# Patient Record
Sex: FEMALE | Race: OTHER | HISPANIC OR LATINO | URBAN - METROPOLITAN AREA
[De-identification: names, ages, dates, MRNs, and addresses within clinical notes are randomized per-mention and may not be internally consistent; named-entity substitution may affect disease eponyms.]

---

## 2017-11-04 ENCOUNTER — EMERGENCY (EMERGENCY)
Facility: HOSPITAL | Age: 42
LOS: 1 days | Discharge: HOME | End: 2017-11-04
Attending: EMERGENCY MEDICINE
Payer: MEDICAID

## 2017-11-04 VITALS
HEIGHT: 62 IN | TEMPERATURE: 97 F | HEART RATE: 87 BPM | RESPIRATION RATE: 20 BRPM | OXYGEN SATURATION: 100 % | WEIGHT: 115.08 LBS

## 2017-11-04 LAB
ACETONE SERPL-MCNC: NEGATIVE — SIGNIFICANT CHANGE UP
ALBUMIN SERPL ELPH-MCNC: 3.6 G/DL — SIGNIFICANT CHANGE UP (ref 3.5–5)
ALP SERPL-CCNC: 87 U/L — SIGNIFICANT CHANGE UP (ref 40–120)
ALT FLD-CCNC: 22 U/L DA — SIGNIFICANT CHANGE UP (ref 10–60)
ANION GAP SERPL CALC-SCNC: 8 MMOL/L — SIGNIFICANT CHANGE UP (ref 5–17)
APPEARANCE UR: CLEAR — SIGNIFICANT CHANGE UP
AST SERPL-CCNC: 13 U/L — SIGNIFICANT CHANGE UP (ref 10–40)
BACTERIA # UR AUTO: ABNORMAL /HPF
BASOPHILS # BLD AUTO: 0.1 K/UL — SIGNIFICANT CHANGE UP (ref 0–0.2)
BASOPHILS NFR BLD AUTO: 1 % — SIGNIFICANT CHANGE UP (ref 0–2)
BILIRUB SERPL-MCNC: 0.1 MG/DL — LOW (ref 0.2–1.2)
BILIRUB UR-MCNC: NEGATIVE — SIGNIFICANT CHANGE UP
BUN SERPL-MCNC: 18 MG/DL — SIGNIFICANT CHANGE UP (ref 7–18)
CALCIUM SERPL-MCNC: 8.4 MG/DL — SIGNIFICANT CHANGE UP (ref 8.4–10.5)
CHLORIDE SERPL-SCNC: 109 MMOL/L — HIGH (ref 96–108)
CO2 SERPL-SCNC: 23 MMOL/L — SIGNIFICANT CHANGE UP (ref 22–31)
COLOR SPEC: YELLOW — SIGNIFICANT CHANGE UP
CREAT SERPL-MCNC: 0.69 MG/DL — SIGNIFICANT CHANGE UP (ref 0.5–1.3)
DIFF PNL FLD: ABNORMAL
EOSINOPHIL # BLD AUTO: 0.2 K/UL — SIGNIFICANT CHANGE UP (ref 0–0.5)
EOSINOPHIL NFR BLD AUTO: 2.6 % — SIGNIFICANT CHANGE UP (ref 0–6)
EPI CELLS # UR: SIGNIFICANT CHANGE UP /HPF
GLUCOSE SERPL-MCNC: 93 MG/DL — SIGNIFICANT CHANGE UP (ref 70–99)
GLUCOSE UR QL: NEGATIVE — SIGNIFICANT CHANGE UP
HCG UR QL: NEGATIVE — SIGNIFICANT CHANGE UP
HCT VFR BLD CALC: 38.3 % — SIGNIFICANT CHANGE UP (ref 34.5–45)
HGB BLD-MCNC: 11.6 G/DL — SIGNIFICANT CHANGE UP (ref 11.5–15.5)
KETONES UR-MCNC: ABNORMAL
LEUKOCYTE ESTERASE UR-ACNC: ABNORMAL
LIDOCAIN IGE QN: 259 U/L — SIGNIFICANT CHANGE UP (ref 73–393)
LYMPHOCYTES # BLD AUTO: 1.2 K/UL — SIGNIFICANT CHANGE UP (ref 1–3.3)
LYMPHOCYTES # BLD AUTO: 17.6 % — SIGNIFICANT CHANGE UP (ref 13–44)
MAGNESIUM SERPL-MCNC: 2.1 MG/DL — SIGNIFICANT CHANGE UP (ref 1.6–2.6)
MCHC RBC-ENTMCNC: 24.9 PG — LOW (ref 27–34)
MCHC RBC-ENTMCNC: 30.4 GM/DL — LOW (ref 32–36)
MCV RBC AUTO: 82 FL — SIGNIFICANT CHANGE UP (ref 80–100)
MONOCYTES # BLD AUTO: 0.5 K/UL — SIGNIFICANT CHANGE UP (ref 0–0.9)
MONOCYTES NFR BLD AUTO: 7.8 % — SIGNIFICANT CHANGE UP (ref 2–14)
NEUTROPHILS # BLD AUTO: 4.9 K/UL — SIGNIFICANT CHANGE UP (ref 1.8–7.4)
NEUTROPHILS NFR BLD AUTO: 71 % — SIGNIFICANT CHANGE UP (ref 43–77)
NITRITE UR-MCNC: NEGATIVE — SIGNIFICANT CHANGE UP
PH UR: 5 — SIGNIFICANT CHANGE UP (ref 5–8)
PLATELET # BLD AUTO: 220 K/UL — SIGNIFICANT CHANGE UP (ref 150–400)
POTASSIUM SERPL-MCNC: 3.9 MMOL/L — SIGNIFICANT CHANGE UP (ref 3.5–5.3)
POTASSIUM SERPL-SCNC: 3.9 MMOL/L — SIGNIFICANT CHANGE UP (ref 3.5–5.3)
PROT SERPL-MCNC: 7.1 G/DL — SIGNIFICANT CHANGE UP (ref 6–8.3)
PROT UR-MCNC: NEGATIVE — SIGNIFICANT CHANGE UP
RBC # BLD: 4.67 M/UL — SIGNIFICANT CHANGE UP (ref 3.8–5.2)
RBC # FLD: 14.4 % — SIGNIFICANT CHANGE UP (ref 10.3–14.5)
RBC CASTS # UR COMP ASSIST: SIGNIFICANT CHANGE UP /HPF (ref 0–2)
SODIUM SERPL-SCNC: 140 MMOL/L — SIGNIFICANT CHANGE UP (ref 135–145)
SP GR SPEC: 1.02 — SIGNIFICANT CHANGE UP (ref 1.01–1.02)
UROBILINOGEN FLD QL: NEGATIVE — SIGNIFICANT CHANGE UP
WBC # BLD: 7 K/UL — SIGNIFICANT CHANGE UP (ref 3.8–10.5)
WBC # FLD AUTO: 7 K/UL — SIGNIFICANT CHANGE UP (ref 3.8–10.5)
WBC UR QL: SIGNIFICANT CHANGE UP /HPF (ref 0–5)

## 2017-11-04 PROCEDURE — 85027 COMPLETE CBC AUTOMATED: CPT

## 2017-11-04 PROCEDURE — 76830 TRANSVAGINAL US NON-OB: CPT | Mod: 26

## 2017-11-04 PROCEDURE — 76856 US EXAM PELVIC COMPLETE: CPT

## 2017-11-04 PROCEDURE — 87086 URINE CULTURE/COLONY COUNT: CPT

## 2017-11-04 PROCEDURE — 82009 KETONE BODYS QUAL: CPT

## 2017-11-04 PROCEDURE — 99285 EMERGENCY DEPT VISIT HI MDM: CPT

## 2017-11-04 PROCEDURE — 76830 TRANSVAGINAL US NON-OB: CPT

## 2017-11-04 PROCEDURE — 96375 TX/PRO/DX INJ NEW DRUG ADDON: CPT

## 2017-11-04 PROCEDURE — 81001 URINALYSIS AUTO W/SCOPE: CPT

## 2017-11-04 PROCEDURE — 76856 US EXAM PELVIC COMPLETE: CPT | Mod: 26

## 2017-11-04 PROCEDURE — 81025 URINE PREGNANCY TEST: CPT

## 2017-11-04 PROCEDURE — 99284 EMERGENCY DEPT VISIT MOD MDM: CPT | Mod: 25

## 2017-11-04 PROCEDURE — 83735 ASSAY OF MAGNESIUM: CPT

## 2017-11-04 PROCEDURE — 83690 ASSAY OF LIPASE: CPT

## 2017-11-04 PROCEDURE — 96374 THER/PROPH/DIAG INJ IV PUSH: CPT

## 2017-11-04 PROCEDURE — 80053 COMPREHEN METABOLIC PANEL: CPT

## 2017-11-04 RX ORDER — SODIUM CHLORIDE 9 MG/ML
1000 INJECTION INTRAMUSCULAR; INTRAVENOUS; SUBCUTANEOUS
Qty: 0 | Refills: 0 | Status: DISCONTINUED | OUTPATIENT
Start: 2017-11-04 | End: 2017-11-11

## 2017-11-04 RX ORDER — FAMOTIDINE 10 MG/ML
20 INJECTION INTRAVENOUS ONCE
Qty: 0 | Refills: 0 | Status: COMPLETED | OUTPATIENT
Start: 2017-11-04 | End: 2017-11-04

## 2017-11-04 RX ORDER — KETOROLAC TROMETHAMINE 30 MG/ML
30 SYRINGE (ML) INJECTION ONCE
Qty: 0 | Refills: 0 | Status: DISCONTINUED | OUTPATIENT
Start: 2017-11-04 | End: 2017-11-04

## 2017-11-04 RX ORDER — SODIUM CHLORIDE 9 MG/ML
3 INJECTION INTRAMUSCULAR; INTRAVENOUS; SUBCUTANEOUS ONCE
Qty: 0 | Refills: 0 | Status: COMPLETED | OUTPATIENT
Start: 2017-11-04 | End: 2017-11-04

## 2017-11-04 RX ADMIN — SODIUM CHLORIDE 125 MILLILITER(S): 9 INJECTION INTRAMUSCULAR; INTRAVENOUS; SUBCUTANEOUS at 10:49

## 2017-11-04 RX ADMIN — Medication 30 MILLIGRAM(S): at 11:31

## 2017-11-04 RX ADMIN — SODIUM CHLORIDE 3 MILLILITER(S): 9 INJECTION INTRAMUSCULAR; INTRAVENOUS; SUBCUTANEOUS at 10:49

## 2017-11-04 RX ADMIN — FAMOTIDINE 20 MILLIGRAM(S): 10 INJECTION INTRAVENOUS at 10:48

## 2017-11-04 RX ADMIN — Medication 30 MILLIGRAM(S): at 11:01

## 2017-11-04 NOTE — ED PROVIDER NOTE - MEDICAL DECISION MAKING DETAILS
43 y/o F pt w/ pelvic pain, concern for torsion versus ruptured ovarian cyst. Unlikely PID. Will get labs, sono, analgesic and reassess

## 2017-11-04 NOTE — ED ADULT NURSE NOTE - OBJECTIVE STATEMENT
Pt states has lower abd pain since 0930, denies nausea, vomiting or diarrhea  Had similar pain a few weeks ago, her Ob -Gyn told her she has Left ovarian cyst, but it is too small.

## 2017-11-04 NOTE — ED PROVIDER NOTE - OBJECTIVE STATEMENT
41 y/o F pt w/ PMHx of HIV (last viral load was undetected 3 months ago, CD4 count approximately 500) presents to ED c/o lower pelvic pain x1 hour. Pt notes that her pain is radiating down her R leg and constant, 9/10 in severity and associated w/ dysuria. Pt denies fever, N/V/D, hematuria, frequency, vaginal discharge, or any other complaints. Pt reports no history of STDs. Pt had similar pain 2 months ago; pt had sono done which showed L ovarian cyst, no intervention needed at that time. LMP 10/10/17 per pt. NKDA.

## 2017-11-05 LAB
CULTURE RESULTS: SIGNIFICANT CHANGE UP
SPECIMEN SOURCE: SIGNIFICANT CHANGE UP

## 2017-11-06 LAB
C TRACH RRNA SPEC QL NAA+PROBE: SIGNIFICANT CHANGE UP
N GONORRHOEA RRNA SPEC QL NAA+PROBE: SIGNIFICANT CHANGE UP
SPECIMEN SOURCE: SIGNIFICANT CHANGE UP

## 2018-12-25 ENCOUNTER — EMERGENCY (EMERGENCY)
Facility: HOSPITAL | Age: 43
LOS: 1 days | Discharge: HOME | End: 2018-12-25
Attending: EMERGENCY MEDICINE
Payer: MEDICAID

## 2018-12-25 VITALS — WEIGHT: 113.1 LBS | HEIGHT: 62 IN

## 2018-12-25 VITALS
RESPIRATION RATE: 17 BRPM | OXYGEN SATURATION: 100 % | TEMPERATURE: 98 F | DIASTOLIC BLOOD PRESSURE: 71 MMHG | HEART RATE: 78 BPM | SYSTOLIC BLOOD PRESSURE: 124 MMHG

## 2018-12-25 PROCEDURE — 99283 EMERGENCY DEPT VISIT LOW MDM: CPT | Mod: 25

## 2018-12-25 PROCEDURE — 90715 TDAP VACCINE 7 YRS/> IM: CPT

## 2018-12-25 PROCEDURE — 73630 X-RAY EXAM OF FOOT: CPT

## 2018-12-25 PROCEDURE — 90471 IMMUNIZATION ADMIN: CPT

## 2018-12-25 PROCEDURE — 73630 X-RAY EXAM OF FOOT: CPT | Mod: 26,LT

## 2018-12-25 PROCEDURE — 12001 RPR S/N/AX/GEN/TRNK 2.5CM/<: CPT

## 2018-12-25 RX ORDER — TETANUS TOXOID, REDUCED DIPHTHERIA TOXOID AND ACELLULAR PERTUSSIS VACCINE, ADSORBED 5; 2.5; 8; 8; 2.5 [IU]/.5ML; [IU]/.5ML; UG/.5ML; UG/.5ML; UG/.5ML
0.5 SUSPENSION INTRAMUSCULAR ONCE
Qty: 0 | Refills: 0 | Status: COMPLETED | OUTPATIENT
Start: 2018-12-25 | End: 2018-12-25

## 2018-12-25 RX ORDER — LIDOCAINE HCL 20 MG/ML
10 VIAL (ML) INJECTION ONCE
Qty: 0 | Refills: 0 | Status: DISCONTINUED | OUTPATIENT
Start: 2018-12-25 | End: 2018-12-29

## 2018-12-25 RX ADMIN — TETANUS TOXOID, REDUCED DIPHTHERIA TOXOID AND ACELLULAR PERTUSSIS VACCINE, ADSORBED 0.5 MILLILITER(S): 5; 2.5; 8; 8; 2.5 SUSPENSION INTRAMUSCULAR at 20:18

## 2018-12-25 NOTE — ED PROVIDER NOTE - OBJECTIVE STATEMENT
44 y/o F with PMHx of HIV (viral load undetectable) on triple therapy presents to the ED with complaints of laceration to L foot. Patient states she dropped a mug on her foot at approximately 09:30 this morning. Patient states she put Neosporin and wrapped her foot, however as the wound continued to bleed presented to the ED. Patient reports mild pain to the top of the foot. Tetanus vaccination is not up to date. NKDA. 44 y/o F with PMHx of HIV (viral load undetectable) on triple therapy presents to the ED with complaints of laceration to L foot. Patient states she dropped a mug on her foot at approximately 09:30 this morning. Patient states she put Neosporin and wrapped her foot, however as the wound continued to bleed presented to the ED. Patient reports mild pain to the top of the foot. Denies sensory or motor loss or any other symptoms. Tetanus vaccination is not up to date. NKDA.

## 2018-12-25 NOTE — ED PROVIDER NOTE - MEDICAL DECISION MAKING DETAILS
Simple laceration with no signs of fracture, foreign body or nerve impingement. Will obtain X-ray and suture. Counseled in return precautions, further symptomatic care and hygiene. Patient given Tetanus vaccination.

## 2018-12-25 NOTE — ED PROCEDURE NOTE - CPROC ED LACER REPAIR DETAIL1
Multiple flaps were aligned./The wound was explored to base in bloodless field./No foreign body The wound was explored to base in bloodless field./No foreign body

## 2018-12-25 NOTE — ED PROVIDER NOTE - PHYSICAL EXAMINATION
Afebrile, hemodynamically stable, saturating well  NAD, well appearing  Head NCAT  EOMI grossly, anicteric  MMM  No JVD  RRR, nml S1/S2, no m/r/g  Lungs CTAB, no w/r/r  Abd soft, NT, ND, nml BS, no rebound or guarding  AAO, CN's 3-12 grossly intact  RAYO spontaneously, no leg cyanosis or edema  Skin warm, well perfused, no rashes or hives Afebrile, hemodynamically stable, saturating well  NAD, well appearing  Head NCAT  EOMI grossly, anicteric  MMM  No JVD  RRR, nml S1/S2, no m/r/g  Lungs CTAB, no w/r/r  Abd soft, NT, ND, nml BS, no rebound or guarding  AAO, CN's 3-12 grossly intact  RAYO spontaneously, no leg cyanosis or edema  Skin warm, well perfused, no rashes or hives  1 cm laceration to dorsal L foot. Bleeding very well controlled without pressure. 2+ distal phalange pulses. Normal plantar and dorsiflexion. Normal warmth, color and sensation. No palpable foreign body. Normal gait. Afebrile, hemodynamically stable, saturating well  NAD, well appearing  Head NCAT  EOMI grossly  MMM  Breathing comfortably on RA  AAO, CN's 3-12 grossly intact  RAYO spontaneously, no leg cyanosis or edema  Skin warm, well perfused, no rashes or hives  1 cm laceration to dorsal L foot. Bleeding very well controlled without pressure. 2+ DP pulses. Normal plantar and dorsiflexion. Normal warmth, color and sensation. No palpable foreign body. Normal gait.

## 2018-12-25 NOTE — ED ADULT NURSE NOTE - NSIMPLEMENTINTERV_GEN_ALL_ED
Implemented All Universal Safety Interventions:  [place] to call system. Call bell, personal items and telephone within reach. Instruct patient to call for assistance. Room bathroom lighting operational. Non-slip footwear when patient is off stretcher. Physically safe environment: no spills, clutter or unnecessary equipment. Stretcher in lowest position, wheels locked, appropriate side rails in place.

## 2020-02-06 ENCOUNTER — EMERGENCY (EMERGENCY)
Facility: HOSPITAL | Age: 45
LOS: 1 days | Discharge: HOME | End: 2020-02-06
Attending: EMERGENCY MEDICINE
Payer: MEDICAID

## 2020-02-06 VITALS
RESPIRATION RATE: 20 BRPM | HEART RATE: 89 BPM | SYSTOLIC BLOOD PRESSURE: 131 MMHG | TEMPERATURE: 98 F | HEIGHT: 62 IN | WEIGHT: 117.95 LBS | OXYGEN SATURATION: 100 % | DIASTOLIC BLOOD PRESSURE: 90 MMHG

## 2020-02-06 LAB
ALBUMIN SERPL ELPH-MCNC: 3.8 G/DL — SIGNIFICANT CHANGE UP (ref 3.5–5)
ALP SERPL-CCNC: 85 U/L — SIGNIFICANT CHANGE UP (ref 40–120)
ALT FLD-CCNC: 39 U/L DA — SIGNIFICANT CHANGE UP (ref 10–60)
ANION GAP SERPL CALC-SCNC: 6 MMOL/L — SIGNIFICANT CHANGE UP (ref 5–17)
APPEARANCE UR: CLEAR — SIGNIFICANT CHANGE UP
AST SERPL-CCNC: 32 U/L — SIGNIFICANT CHANGE UP (ref 10–40)
BILIRUB SERPL-MCNC: 0.2 MG/DL — SIGNIFICANT CHANGE UP (ref 0.2–1.2)
BILIRUB UR-MCNC: NEGATIVE — SIGNIFICANT CHANGE UP
BUN SERPL-MCNC: 15 MG/DL — SIGNIFICANT CHANGE UP (ref 7–18)
CALCIUM SERPL-MCNC: 8.3 MG/DL — LOW (ref 8.4–10.5)
CHLORIDE SERPL-SCNC: 108 MMOL/L — SIGNIFICANT CHANGE UP (ref 96–108)
CO2 SERPL-SCNC: 24 MMOL/L — SIGNIFICANT CHANGE UP (ref 22–31)
COLOR SPEC: YELLOW — SIGNIFICANT CHANGE UP
CREAT SERPL-MCNC: 0.8 MG/DL — SIGNIFICANT CHANGE UP (ref 0.5–1.3)
DIFF PNL FLD: NEGATIVE — SIGNIFICANT CHANGE UP
GLUCOSE SERPL-MCNC: 110 MG/DL — HIGH (ref 70–99)
GLUCOSE UR QL: NEGATIVE — SIGNIFICANT CHANGE UP
HCG SERPL-ACNC: <1 MIU/ML — SIGNIFICANT CHANGE UP
HCT VFR BLD CALC: 39.1 % — SIGNIFICANT CHANGE UP (ref 34.5–45)
HGB BLD-MCNC: 12.4 G/DL — SIGNIFICANT CHANGE UP (ref 11.5–15.5)
KETONES UR-MCNC: NEGATIVE — SIGNIFICANT CHANGE UP
LEUKOCYTE ESTERASE UR-ACNC: NEGATIVE — SIGNIFICANT CHANGE UP
LIDOCAIN IGE QN: 139 U/L — SIGNIFICANT CHANGE UP (ref 73–393)
MCHC RBC-ENTMCNC: 24.2 PG — LOW (ref 27–34)
MCHC RBC-ENTMCNC: 31.7 GM/DL — LOW (ref 32–36)
MCV RBC AUTO: 76.4 FL — LOW (ref 80–100)
NITRITE UR-MCNC: NEGATIVE — SIGNIFICANT CHANGE UP
NRBC # BLD: 0 /100 WBCS — SIGNIFICANT CHANGE UP (ref 0–0)
PH UR: 6.5 — SIGNIFICANT CHANGE UP (ref 5–8)
PLATELET # BLD AUTO: 247 K/UL — SIGNIFICANT CHANGE UP (ref 150–400)
POTASSIUM SERPL-MCNC: 4.6 MMOL/L — SIGNIFICANT CHANGE UP (ref 3.5–5.3)
POTASSIUM SERPL-SCNC: 4.6 MMOL/L — SIGNIFICANT CHANGE UP (ref 3.5–5.3)
PROT SERPL-MCNC: 7.8 G/DL — SIGNIFICANT CHANGE UP (ref 6–8.3)
PROT UR-MCNC: NEGATIVE — SIGNIFICANT CHANGE UP
RBC # BLD: 5.12 M/UL — SIGNIFICANT CHANGE UP (ref 3.8–5.2)
RBC # FLD: 15.9 % — HIGH (ref 10.3–14.5)
SODIUM SERPL-SCNC: 138 MMOL/L — SIGNIFICANT CHANGE UP (ref 135–145)
SP GR SPEC: 1.01 — SIGNIFICANT CHANGE UP (ref 1.01–1.02)
UROBILINOGEN FLD QL: NEGATIVE — SIGNIFICANT CHANGE UP
WBC # BLD: 8.7 K/UL — SIGNIFICANT CHANGE UP (ref 3.8–10.5)
WBC # FLD AUTO: 8.7 K/UL — SIGNIFICANT CHANGE UP (ref 3.8–10.5)

## 2020-02-06 PROCEDURE — 87086 URINE CULTURE/COLONY COUNT: CPT

## 2020-02-06 PROCEDURE — 80053 COMPREHEN METABOLIC PANEL: CPT

## 2020-02-06 PROCEDURE — 96374 THER/PROPH/DIAG INJ IV PUSH: CPT

## 2020-02-06 PROCEDURE — 84702 CHORIONIC GONADOTROPIN TEST: CPT

## 2020-02-06 PROCEDURE — 99284 EMERGENCY DEPT VISIT MOD MDM: CPT

## 2020-02-06 PROCEDURE — 85027 COMPLETE CBC AUTOMATED: CPT

## 2020-02-06 PROCEDURE — 36415 COLL VENOUS BLD VENIPUNCTURE: CPT

## 2020-02-06 PROCEDURE — 83690 ASSAY OF LIPASE: CPT

## 2020-02-06 PROCEDURE — 99284 EMERGENCY DEPT VISIT MOD MDM: CPT | Mod: 25

## 2020-02-06 PROCEDURE — 81001 URINALYSIS AUTO W/SCOPE: CPT

## 2020-02-06 RX ORDER — SODIUM CHLORIDE 9 MG/ML
1000 INJECTION INTRAMUSCULAR; INTRAVENOUS; SUBCUTANEOUS ONCE
Refills: 0 | Status: COMPLETED | OUTPATIENT
Start: 2020-02-06 | End: 2020-02-06

## 2020-02-06 RX ORDER — KETOROLAC TROMETHAMINE 30 MG/ML
15 SYRINGE (ML) INJECTION ONCE
Refills: 0 | Status: DISCONTINUED | OUTPATIENT
Start: 2020-02-06 | End: 2020-02-06

## 2020-02-06 RX ADMIN — SODIUM CHLORIDE 1000 MILLILITER(S): 9 INJECTION INTRAMUSCULAR; INTRAVENOUS; SUBCUTANEOUS at 23:14

## 2020-02-06 RX ADMIN — Medication 15 MILLIGRAM(S): at 23:47

## 2020-02-07 VITALS
SYSTOLIC BLOOD PRESSURE: 125 MMHG | HEART RATE: 87 BPM | TEMPERATURE: 98 F | OXYGEN SATURATION: 98 % | DIASTOLIC BLOOD PRESSURE: 65 MMHG | RESPIRATION RATE: 19 BRPM

## 2020-02-07 RX ORDER — IBUPROFEN 200 MG
1 TABLET ORAL
Qty: 12 | Refills: 0
Start: 2020-02-07 | End: 2020-02-09

## 2020-02-07 RX ADMIN — Medication 15 MILLIGRAM(S): at 00:52

## 2020-02-07 NOTE — ED PROVIDER NOTE - PATIENT PORTAL LINK FT
You can access the FollowMyHealth Patient Portal offered by [facility] by registering at the following website: [email or web address]. By joining [employer]’s FollowMyHealth portal, you will also be able to view your health information using other applications (apps) compatible with our system.

## 2020-02-07 NOTE — ED PROVIDER NOTE - OBJECTIVE STATEMENT
43 yo F pmh of HIV, uterine fibroids, and ovarian cysts presents with lower abd pain x a 2-3 hours, initially L sided but now bilateral. Feels like previous episodes of ovarian cysts. Denies fever, n/v, flank pain, diarrhea, constipation, urinary complaints, other acute complaints. 45 yo F pmh of HIV, uterine fibroids, and ovarian cysts presents with lower abd pain x a 2-3 hours, initially L sided but now bilateral. Feels like previous episodes of ovarian cysts. Denies fever, n/v, flank pain, diarrhea, constipation, urinary complaints, other acute complaints. Pt states that she came to ED b/c she is concerned about cancer as she was told HIV can be associated with cancer.

## 2020-02-07 NOTE — ED PROVIDER NOTE - NSFOLLOWUPINSTRUCTIONS_ED_ALL_ED_FT
Pelvic Pain, Female  Pelvic pain is pain in your lower belly (abdomen), below your belly button and between your hips. The pain may start suddenly (be acute), keep coming back (be recurring), or last a long time (become chronic). Pelvic pain that lasts longer than 6 months is called chronic pelvic pain. There are many causes of pelvic pain. Sometimes the cause of pelvic pain is not known.  Follow these instructions at home:     Take over-the-counter and prescription medicines only as told by your doctor.Rest as told by your doctor.Do not have sex if it hurts.Keep a journal of your pelvic pain. Write down:  When the pain started.Where the pain is located.What seems to make the pain better or worse, such as food or your period (menstrual cycle).Any symptoms you have along with the pain.Keep all follow-up visits as told by your doctor. This is important.Contact a doctor if:  Medicine does not help your pain.Your pain comes back.You have new symptoms.You have unusual discharge or bleeding from your vagina.You have a fever or chills.You are having trouble pooping (constipation).You have blood in your pee (urine) or poop (stool).Your pee smells bad.You feel weak or light-headed.Get help right away if:  You have sudden pain that is very bad.Your pain keeps getting worse.You have very bad pain and also have any of these symptoms:  A fever.Feeling sick to your stomach (nausea).Throwing up (vomiting).Being very sweaty.You pass out (lose consciousness).Summary  Pelvic pain is pain in your lower belly (abdomen), below your belly button and between your hips.There are many possible causes of pelvic pain.Keep a journal of your pelvic pain.This information is not intended to replace advice given to you by your health care provider. Make sure you discuss any questions you have with your health care provider.    Document Released: 06/05/2009 Document Revised: 06/05/2019 Document Reviewed: 06/05/2019  [employer] Patient Education © 2019 [employer]

## 2020-02-07 NOTE — ED PROVIDER NOTE - PROGRESS NOTE DETAILS
labs - hcg negative  UA - negative  Feeling better after toradol. Symptoms possibly related to ovarian cyst. Will fu with ob gyn. Discussed indications for patient return to ED. Patient understood.

## 2020-02-07 NOTE — ED PROVIDER NOTE - CLINICAL SUMMARY MEDICAL DECISION MAKING FREE TEXT BOX
45 yo F with abd pain. Pt appears very comfortable, no suspicion for ovarian torsoin. Plan - UA, labs, meds, +/- imaging, reassess.

## 2020-02-08 LAB
CULTURE RESULTS: NO GROWTH — SIGNIFICANT CHANGE UP
SPECIMEN SOURCE: SIGNIFICANT CHANGE UP

## 2024-05-09 ENCOUNTER — EMERGENCY (EMERGENCY)
Facility: HOSPITAL | Age: 49
LOS: 1 days | Discharge: HOME | End: 2024-05-09
Attending: STUDENT IN AN ORGANIZED HEALTH CARE EDUCATION/TRAINING PROGRAM
Payer: SELF-PAY

## 2024-05-09 VITALS
RESPIRATION RATE: 18 BRPM | OXYGEN SATURATION: 100 % | TEMPERATURE: 98 F | HEART RATE: 82 BPM | DIASTOLIC BLOOD PRESSURE: 75 MMHG | WEIGHT: 142.42 LBS | SYSTOLIC BLOOD PRESSURE: 114 MMHG

## 2024-05-09 PROCEDURE — 99284 EMERGENCY DEPT VISIT MOD MDM: CPT

## 2024-05-09 RX ORDER — SODIUM CHLORIDE 9 MG/ML
1000 INJECTION INTRAMUSCULAR; INTRAVENOUS; SUBCUTANEOUS ONCE
Refills: 0 | Status: COMPLETED | OUTPATIENT
Start: 2024-05-09 | End: 2024-05-09

## 2024-05-09 RX ORDER — ACETAMINOPHEN 500 MG
975 TABLET ORAL ONCE
Refills: 0 | Status: COMPLETED | OUTPATIENT
Start: 2024-05-09 | End: 2024-05-09

## 2024-05-09 RX ORDER — METOCLOPRAMIDE HCL 10 MG
10 TABLET ORAL ONCE
Refills: 0 | Status: COMPLETED | OUTPATIENT
Start: 2024-05-09 | End: 2024-05-09

## 2024-05-09 NOTE — ED ADULT TRIAGE NOTE - CHIEF COMPLAINT QUOTE
I have headache, dizziness, left eye pain and blurred vision x 3 days at risk behaviors demonstrated

## 2024-05-10 VITALS
RESPIRATION RATE: 18 BRPM | TEMPERATURE: 98 F | SYSTOLIC BLOOD PRESSURE: 130 MMHG | OXYGEN SATURATION: 99 % | HEART RATE: 74 BPM | DIASTOLIC BLOOD PRESSURE: 86 MMHG

## 2024-05-10 LAB
ALBUMIN SERPL ELPH-MCNC: 3.5 G/DL — SIGNIFICANT CHANGE UP (ref 3.5–5)
ALP SERPL-CCNC: 78 U/L — SIGNIFICANT CHANGE UP (ref 40–120)
ALT FLD-CCNC: 24 U/L DA — SIGNIFICANT CHANGE UP (ref 10–60)
ANION GAP SERPL CALC-SCNC: 5 MMOL/L — SIGNIFICANT CHANGE UP (ref 5–17)
AST SERPL-CCNC: 14 U/L — SIGNIFICANT CHANGE UP (ref 10–40)
BASOPHILS # BLD AUTO: 0.03 K/UL — SIGNIFICANT CHANGE UP (ref 0–0.2)
BASOPHILS NFR BLD AUTO: 0.4 % — SIGNIFICANT CHANGE UP (ref 0–2)
BILIRUB SERPL-MCNC: 0.2 MG/DL — SIGNIFICANT CHANGE UP (ref 0.2–1.2)
BUN SERPL-MCNC: 16 MG/DL — SIGNIFICANT CHANGE UP (ref 7–18)
CALCIUM SERPL-MCNC: 8.7 MG/DL — SIGNIFICANT CHANGE UP (ref 8.4–10.5)
CHLORIDE SERPL-SCNC: 108 MMOL/L — SIGNIFICANT CHANGE UP (ref 96–108)
CO2 SERPL-SCNC: 26 MMOL/L — SIGNIFICANT CHANGE UP (ref 22–31)
CREAT SERPL-MCNC: 0.65 MG/DL — SIGNIFICANT CHANGE UP (ref 0.5–1.3)
EGFR: 107 ML/MIN/1.73M2 — SIGNIFICANT CHANGE UP
EOSINOPHIL # BLD AUTO: 0.47 K/UL — SIGNIFICANT CHANGE UP (ref 0–0.5)
EOSINOPHIL NFR BLD AUTO: 5.6 % — SIGNIFICANT CHANGE UP (ref 0–6)
GLUCOSE SERPL-MCNC: 98 MG/DL — SIGNIFICANT CHANGE UP (ref 70–99)
HCG SERPL-ACNC: <1 MIU/ML — SIGNIFICANT CHANGE UP
HCT VFR BLD CALC: 35.6 % — SIGNIFICANT CHANGE UP (ref 34.5–45)
HGB BLD-MCNC: 11.3 G/DL — LOW (ref 11.5–15.5)
IMM GRANULOCYTES NFR BLD AUTO: 1.1 % — HIGH (ref 0–0.9)
LYMPHOCYTES # BLD AUTO: 1.88 K/UL — SIGNIFICANT CHANGE UP (ref 1–3.3)
LYMPHOCYTES # BLD AUTO: 22.5 % — SIGNIFICANT CHANGE UP (ref 13–44)
MAGNESIUM SERPL-MCNC: 2.3 MG/DL — SIGNIFICANT CHANGE UP (ref 1.6–2.6)
MCHC RBC-ENTMCNC: 24.4 PG — LOW (ref 27–34)
MCHC RBC-ENTMCNC: 31.7 GM/DL — LOW (ref 32–36)
MCV RBC AUTO: 76.9 FL — LOW (ref 80–100)
MONOCYTES # BLD AUTO: 0.69 K/UL — SIGNIFICANT CHANGE UP (ref 0–0.9)
MONOCYTES NFR BLD AUTO: 8.3 % — SIGNIFICANT CHANGE UP (ref 2–14)
NEUTROPHILS # BLD AUTO: 5.2 K/UL — SIGNIFICANT CHANGE UP (ref 1.8–7.4)
NEUTROPHILS NFR BLD AUTO: 62.1 % — SIGNIFICANT CHANGE UP (ref 43–77)
NRBC # BLD: 0 /100 WBCS — SIGNIFICANT CHANGE UP (ref 0–0)
PLATELET # BLD AUTO: 252 K/UL — SIGNIFICANT CHANGE UP (ref 150–400)
POTASSIUM SERPL-MCNC: 4.2 MMOL/L — SIGNIFICANT CHANGE UP (ref 3.5–5.3)
POTASSIUM SERPL-SCNC: 4.2 MMOL/L — SIGNIFICANT CHANGE UP (ref 3.5–5.3)
PROT SERPL-MCNC: 7.2 G/DL — SIGNIFICANT CHANGE UP (ref 6–8.3)
RBC # BLD: 4.63 M/UL — SIGNIFICANT CHANGE UP (ref 3.8–5.2)
RBC # FLD: 15.6 % — HIGH (ref 10.3–14.5)
SODIUM SERPL-SCNC: 139 MMOL/L — SIGNIFICANT CHANGE UP (ref 135–145)
TROPONIN I, HIGH SENSITIVITY RESULT: <3 NG/L — SIGNIFICANT CHANGE UP
WBC # BLD: 8.36 K/UL — SIGNIFICANT CHANGE UP (ref 3.8–10.5)
WBC # FLD AUTO: 8.36 K/UL — SIGNIFICANT CHANGE UP (ref 3.8–10.5)

## 2024-05-10 PROCEDURE — 96374 THER/PROPH/DIAG INJ IV PUSH: CPT

## 2024-05-10 PROCEDURE — 36415 COLL VENOUS BLD VENIPUNCTURE: CPT

## 2024-05-10 PROCEDURE — 85025 COMPLETE CBC W/AUTO DIFF WBC: CPT

## 2024-05-10 PROCEDURE — 84484 ASSAY OF TROPONIN QUANT: CPT

## 2024-05-10 PROCEDURE — 84702 CHORIONIC GONADOTROPIN TEST: CPT

## 2024-05-10 PROCEDURE — 99284 EMERGENCY DEPT VISIT MOD MDM: CPT | Mod: 25

## 2024-05-10 PROCEDURE — 80053 COMPREHEN METABOLIC PANEL: CPT

## 2024-05-10 PROCEDURE — 83735 ASSAY OF MAGNESIUM: CPT

## 2024-05-10 RX ADMIN — Medication 10 MILLIGRAM(S): at 01:34

## 2024-05-10 RX ADMIN — Medication 975 MILLIGRAM(S): at 01:34

## 2024-05-10 RX ADMIN — SODIUM CHLORIDE 2000 MILLILITER(S): 9 INJECTION INTRAMUSCULAR; INTRAVENOUS; SUBCUTANEOUS at 01:34

## 2024-05-10 NOTE — ED PROVIDER NOTE - CLINICAL SUMMARY MEDICAL DECISION MAKING FREE TEXT BOX
49-year-old female, hx of HIV on HAART, presenting with L eye pain for 2 days and headache. No uptake on fluoroscein exam, visual acuity 20/25 bilaterally. Labs normally. Mitch primary headache however will send rx for polytrim drops given mild eye redness. Advised followup with Ophthalmologist.

## 2024-05-10 NOTE — ED PROVIDER NOTE - PATIENT PORTAL LINK FT
You can access the FollowMyHealth Patient Portal offered by [facility] by registering at the following website: [email or web address]. By joining [employer]’s FollowMyHealth portal, you will also be able to view your health information using other applications (apps) compatible with our system.

## 2024-05-10 NOTE — ED PROVIDER NOTE - NSFOLLOWUPINSTRUCTIONS_ED_ALL_ED_FT
You were seen in the emergency department for: eye pain/headache  Your results report is attached.   You were prescribed antibiotic eye drops.   We recommend you follow up with: your primary care doctor, Ophthalmology    Please return to the Emergency Department if you experience any of the following symptoms:   - Shortness of breath or trouble breathing  - Pressure, pain or tightness in the chest  - Face drooping, arm weakness or speech difficulty  - Persistence of severe vomiting  - Head injury or loss of consciousness  - Nonstop bleeding or an open wound    (1) Follow up with your primary care physician within the next 24-48 hours as discussed. In addition, we did not find evidence of a life threatening illness on your testing here today, but listed below are the specialists that will be necessary to see as an outpatient to continue the workup.  Please call the numbers listed below or [phone] to set up the necessary appointments.  (2) Take Tylenol (up to 1000mg or 1 g)  and/or Motrin (up to 600mg) up to every 6 hours as needed for pain.   (3) If you had an IV (intravenous) line placed, it was removed. Sometimes, after IV removal, that area can be tender for a few days; if it develops redness and swelling, those could be signs of infection; in which case, return to the Emergency Department for assessment.  (4) Please continue taking all of your home medications as directed.

## 2024-05-10 NOTE — ED PROVIDER NOTE - NSFOLLOWUPCLINICS_GEN_ALL_ED_FT
[facility]  Ophthalmology  [address] [place] [address], [facility] [address]  [place] [address]  Phone: [phone]  Fax:

## 2024-05-10 NOTE — ED PROVIDER NOTE - OBJECTIVE STATEMENT
49-year-old female, hx of HIV on HAART, presenting with L eye pain for 2 days. Vision blurry. Headache. Runny nose and throat itching. No other symptoms.

## 2024-05-10 NOTE — ED ADULT NURSE NOTE - CAS EDN DISCHARGE ASSESSMENT
Encounter addended by: Denis Tavera on: 10/14/2021 10:57 PM   Actions taken: Flowsheet accepted
Alert and oriented to person, place and time

## 2024-05-13 ENCOUNTER — EMERGENCY (EMERGENCY)
Facility: HOSPITAL | Age: 49
LOS: 1 days | Discharge: HOME | End: 2024-05-13
Payer: SELF-PAY

## 2024-05-13 VITALS
OXYGEN SATURATION: 97 % | WEIGHT: 139.77 LBS | SYSTOLIC BLOOD PRESSURE: 112 MMHG | DIASTOLIC BLOOD PRESSURE: 72 MMHG | TEMPERATURE: 98 F | HEART RATE: 85 BPM | HEIGHT: 62 IN | RESPIRATION RATE: 16 BRPM

## 2024-05-13 PROCEDURE — 99283 EMERGENCY DEPT VISIT LOW MDM: CPT

## 2024-05-13 RX ORDER — OLOPATADINE HYDROCHLORIDE 1 MG/ML
1 SOLUTION/ DROPS OPHTHALMIC
Qty: 1 | Refills: 0
Start: 2024-05-13 | End: 2024-05-19

## 2024-05-13 RX ORDER — LEVOCETIRIZINE DIHYDROCHLORIDE 0.5 MG/ML
1 SOLUTION ORAL
Qty: 30 | Refills: 0
Start: 2024-05-13 | End: 2024-06-11

## 2024-05-13 NOTE — ED ADULT NURSE NOTE - NSFALLUNIVINTERV_ED_ALL_ED
Bed/Stretcher in lowest position, wheels locked, appropriate side rails in place/Call bell, personal items and telephone in reach/Instruct patient to call for assistance before getting out of bed/chair/stretcher/Non-slip footwear applied when patient is off stretcher/[place] to call system/Physically safe environment - no spills, clutter or unnecessary equipment/Purposeful proactive rounding/Room/bathroom lighting operational, light cord in reach

## 2024-05-13 NOTE — ED PROVIDER NOTE - PATIENT PORTAL LINK FT
You can access the FollowMyHealth Patient Portal offered by [facility] by registering at the following website: [email or web address]. By joining [employer]’s FollowMyHealth portal, you will also be able to view your health information using other applications (apps) compatible with our system.

## 2024-05-13 NOTE — ED PROVIDER NOTE - CARE PLAN
[FreeTextEntry1] : pATIENT REFER TO oFFICE FOR MULTIPLE THYROID NODULES. Patient stated she was a , but retired due to her back pain. She mentioned she drinks daily 3-4 cups of alcohol and soda. and uses marijuana.\par \par 6/13/22 thyroid sonogram [facility]\par NODULE 1: 0.5X 0.5X 0.3 CM RIGHT UPPER POLE\par NODULE 2: 0.5 X 0.5 X 0.7 CM LEFT UPPER POLE\par NODULE 3: 0.7 X 0.6 X 0.3 CM  LEFT UPPER POLE\par NODULE4: 0.7X 0.6 X 0.3 CM LEFT MID POLE 1 Principal Discharge DX:	Subconjunctival hematoma, left  Secondary Diagnosis:	Allergic conjunctivitis, left

## 2024-05-13 NOTE — ED PROVIDER NOTE - CLINICAL SUMMARY MEDICAL DECISION MAKING FREE TEXT BOX
49-year-old female with a past medical history of HIV on HAART presents with left eye itching and redness for 4 days.  Patient reports she felt some pressure in her eye and then there was a large amount of red blood.  Patient was evaluated in the emergency room on May 10 but has not picked up the eyedrops that were sent to the pharmacy.  Patient denies any vision changes, coughing.  Patient admits to heavy lifting at her job.    Eye exam normal at this time. Images of patient's eye reviewed with patient. Appears to have been a subconjunctival hematoma. Patient still reporting itchy eyes and nasal congestion. Will send allergic eye drops to the pharmacy.

## 2024-05-13 NOTE — ED PROVIDER NOTE - NSFOLLOWUPINSTRUCTIONS_ED_ALL_ED_FT
Allergic eyedrops olanzapine sent to the pharmacy.  Take as directed     A daily allergy medication sent to the pharmacy for you.  Take daily.    Follow up with your primary care doctor within 1 week.     You can take Motrin (ibuprofen) 600 mg every 6 hours or Tylenol (acetaminophen) 1000 mg every 6 hours as needed for pain or fever.     If you experience any new or worsening symptoms or if you are concerned you can always come back to the emergency for a re-evaluation.

## 2024-05-13 NOTE — ED PROVIDER NOTE - OBJECTIVE STATEMENT
49-year-old female with a past medical history of HIV on HAART presents with left eye itching and redness for 4 days.  Patient reports she felt some pressure in her eye and then there was a large amount of red blood.  Patient was evaluated in the emergency room on May 10 but has not picked up the eyedrops that were sent to the pharmacy.  Patient denies any vision changes, coughing.  Patient admits to heavy lifting at her job.

## 2025-01-24 NOTE — ED ADULT NURSE NOTE - BEFAST SPEECH SLURRED
Attempted to return call to Ms. Solares, left message noting that I will attempt to call back on Monday and if she has any needs that require prompt attention to call the office for the on-call service or if there is an Emergent need to visit her local emergency department or call 911.   No

## 2025-03-08 ENCOUNTER — EMERGENCY (EMERGENCY)
Facility: HOSPITAL | Age: 50
LOS: 1 days | Discharge: HOME | End: 2025-03-08
Attending: EMERGENCY MEDICINE | Admitting: EMERGENCY MEDICINE
Payer: MEDICAID

## 2025-03-08 VITALS
WEIGHT: 145.06 LBS | DIASTOLIC BLOOD PRESSURE: 82 MMHG | SYSTOLIC BLOOD PRESSURE: 128 MMHG | HEART RATE: 89 BPM | RESPIRATION RATE: 16 BRPM | HEIGHT: 62 IN | TEMPERATURE: 98 F | OXYGEN SATURATION: 98 %

## 2025-03-08 PROCEDURE — 30901 CONTROL OF NOSEBLEED: CPT | Mod: RT

## 2025-03-08 PROCEDURE — 99282 EMERGENCY DEPT VISIT SF MDM: CPT | Mod: 25

## 2025-03-08 PROCEDURE — 30903 CONTROL OF NOSEBLEED: CPT | Mod: RT

## 2025-03-08 PROCEDURE — 99284 EMERGENCY DEPT VISIT MOD MDM: CPT | Mod: 25

## 2025-03-08 RX ORDER — SILVER NITRATE
1 CRYSTALS MISCELLANEOUS ONCE
Refills: 0 | Status: COMPLETED | OUTPATIENT
Start: 2025-03-08 | End: 2025-03-08

## 2025-03-08 RX ADMIN — Medication 1 APPLICATION(S): at 21:25

## 2025-03-08 NOTE — ED PROVIDER NOTE - CARE PROVIDER_API CALL
Oren Liu  Otolaryngology  [address]  [place] [address]  Phone: [phone]  Fax: [phone]  Follow Up Time: 1-3 Days   Oren Liu  Otolaryngology  [address], [address]  [place] [address]  Phone: [phone]  Fax: [phone]  Follow Up Time: 1-3 Days    Ruy James  Otolaryngology  [address], [address]  [place] [address]  Phone: [phone]  Fax: [phone]  Follow Up Time:

## 2025-03-08 NOTE — ED PROVIDER NOTE - NSICDXPASTMEDICALHX_GEN_ALL_CORE_FT
Transition of care performed with sharing of clinical summary
PAST MEDICAL HISTORY:  HIV disease     
Transition of care performed with sharing of clinical summary

## 2025-03-08 NOTE — ED ADULT NURSE NOTE - CHPI ED NUR SYMPTOMS POS
· HPI Objective Statement: Patient presents with intermittent bleeding from right nostril for a few days.  States it was worse tonight.  States the bleeding usually occurs at night.  Patient not on any blood thinners.  No injury to nose.  Bleeding is presently stopped.  Patient states history of nosebleeds in the past.

## 2025-03-08 NOTE — ED PROVIDER NOTE - CLINICAL SUMMARY MEDICAL DECISION MAKING FREE TEXT BOX
Patient with bleeding from right nostril now stopped.  Has visible patchy bleeding areas of the right anterior nostril on the septal surface.  Will apply silver nitrate and give ENT follow-up.

## 2025-03-08 NOTE — ED PROVIDER NOTE - NSFOLLOWUPINSTRUCTIONS_ED_ALL_ED_FT
Nosebleed, Adult  A nosebleed is when blood comes out of the nose. Nosebleeds are common. Usually, they are not a sign of a serious condition.    Nosebleeds can happen if a blood vessel in your nose starts to bleed or if the lining of your nose (mucous membrane) cracks. They are commonly caused by:  Allergies.  Colds.  Picking your nose.  Blowing your nose too hard.  An injury from sticking an object into your nose or getting hit in the nose.  Dry or cold air.  Less common causes of nosebleeds include:  Toxic fumes.  Something abnormal in the nose or in the air-filled spaces in the bones of the face (sinuses).  Growths in the nose, such as polyps.  Blood thinners or conditions that cause blood to clot slowly.  Certain illnesses or procedures that irritate or dry out the nasal passages.  Follow these instructions at home:  When you have a nosebleed:    The correct and incorrect way to hold your head and fingers for first aid during a nosebleed.  Sit down and tilt your head slightly forward.  Use a clean towel or tissue to pinch your nostrils under the bony part of your nose. After 5 minutes, let go of your nose and see if the bleeding starts again. Do not release pressure before that time. If there is still bleeding, repeat the pinching and holding for 5 minutes or until the bleeding stops.  Do not place tissues or gauze in the nose to stop the bleeding.  Avoid lying down and avoid tilting your head backward. That may cause blood to collect in the throat and cause gagging or coughing.  Use a nasal spray decongestant to help with a nosebleed as told by your health care provider.  After a nosebleed:    Avoid blowing your nose or sniffing for a number of hours.  Avoid straining, lifting, or bending at the waist for several days. You may go back to other normal activities as you are able.  If you are taking aspirin or blood thinners and you have nosebleeds, talk to your health care provider. These medicines make bleeding more likely.  Ask your health care provider if you should stop taking the medicines or if you should adjust the dose.  Do not stop taking medicines that your health care provider has recommended unless he or she tells you to stop taking them.  If your nosebleed was caused by dry mucous membranes, use over-the-counter saline nasal spray or gel and a humidifier as told by your health care provider. This will keep the mucous membranes moist and allow them to heal. If you need to use a nasal spray or gel:  Choose one that is water-soluble.  Use only as much as you need and use it only as often as needed.  Do not lie down right after you use it.  If you get nosebleeds often, talk with your health care provider about medical treatments. Options may include:  Nasal cautery. This treatment stops and prevents nosebleeds by using a chemical swab or electrical device to lightly burn tiny blood vessels inside the nose.  Nasal packing. A gauze or other material is placed in the nose to keep constant pressure on the bleeding area.  Contact a health care provider if:  You have a fever.  You get nosebleeds often or more often than usual.  You bruise very easily.  You have a nose bleed from having something stuck in your nose.  You have bleeding in your mouth.  You vomit or cough up brown material.  You have a nosebleed after you start a new medicine.  Get help right away if:  You have a nosebleed after a fall or a head injury.  Your nosebleed does not go away after 20 minutes.  You feel dizzy or weak.  You have unusual bleeding from other parts of your body.  You have unusual bruising on other parts of your body.  You get sweaty.  You vomit blood.  Summary  A nosebleed is when blood comes out of the nose. Common causes include allergies, an injury to the nose, or cold or dry air.  Initial treatment includes applying pressure for 5 minutes.  Moisturizing the nose with saline nasal spray or gel after a nosebleed may help prevent future bleeding.  Get help right away if your nosebleed does not go away after 20 minutes.  This information is not intended to replace advice given to you by your health care provider. Make sure you discuss any questions you have with your health care provider.

## 2025-03-08 NOTE — ED ADULT TRIAGE NOTE - CHIEF COMPLAINT QUOTE
I have a bloody nose x2 days intermittent; right nostril bleeding since 5pm; I have had nosebleeds in the past

## 2025-03-08 NOTE — ED PROVIDER NOTE - PROGRESS NOTE DETAILS
Patient has some rebleeding after application of silver nitrate.  Merisel inserted.  Bleeding controlled.

## 2025-03-08 NOTE — ED PROVIDER NOTE - PROVIDER TOKENS
PROVIDER:[TOKEN:[[id number]],FOLLOWUP:[1-3 Days]] PROVIDER:[TOKEN:[[id number]],FOLLOWUP:[1-3 Days]],PROVIDER:[TOKEN:[[id number]]]

## 2025-03-08 NOTE — ED PROVIDER NOTE - PATIENT PORTAL LINK FT
You can access the FollowMyHealth Patient Portal offered by [facility] by registering at the following website: [email or web address]. By joining [employer]’s FollowMyHealth portal, you will also be able to view your health information using other applications (apps) compatible with our system.

## 2025-03-08 NOTE — ED PROCEDURE NOTE - CPROC ED NASAL DETAIL1
silver nitrate applied/The bleeding stopped.
The source of the bleeding was clearly identified./The anatomic location was packed.

## 2025-03-08 NOTE — ED PROVIDER NOTE - CARE PROVIDERS DIRECT ADDRESSES
[email or web address]

## 2025-03-08 NOTE — ED PROVIDER NOTE - OBJECTIVE STATEMENT
Patient presents with intermittent bleeding from right nostril for a few days.  States it was worse tonight.  States the bleeding usually occurs at night.  Patient not on any blood thinners.  No injury to nose.  Bleeding is presently stopped.  Patient states history of nosebleeds in the past.

## 2025-03-11 PROBLEM — Z00.00 ENCOUNTER FOR PREVENTIVE HEALTH EXAMINATION: Status: ACTIVE | Noted: 2025-03-11

## 2025-03-11 PROBLEM — B20 HUMAN IMMUNODEFICIENCY VIRUS [HIV] DISEASE: Chronic | Status: ACTIVE | Noted: 2025-03-08

## 2025-03-12 ENCOUNTER — APPOINTMENT (OUTPATIENT)
Dept: OTOLARYNGOLOGY | Facility: CLINIC | Age: 50
End: 2025-03-12

## 2025-03-12 ENCOUNTER — NON-APPOINTMENT (OUTPATIENT)
Age: 50
End: 2025-03-12

## 2025-03-12 VITALS
BODY MASS INDEX: 23.37 KG/M2 | DIASTOLIC BLOOD PRESSURE: 74 MMHG | SYSTOLIC BLOOD PRESSURE: 115 MMHG | HEIGHT: 62 IN | HEART RATE: 93 BPM | WEIGHT: 127 LBS

## 2025-03-12 DIAGNOSIS — Z80.3 FAMILY HISTORY OF MALIGNANT NEOPLASM OF BREAST: ICD-10-CM

## 2025-03-12 DIAGNOSIS — J31.0 CHRONIC RHINITIS: ICD-10-CM

## 2025-03-12 DIAGNOSIS — Z78.9 OTHER SPECIFIED HEALTH STATUS: ICD-10-CM

## 2025-03-12 DIAGNOSIS — J32.9 CHRONIC SINUSITIS, UNSPECIFIED: ICD-10-CM

## 2025-03-12 PROCEDURE — 99203 OFFICE O/P NEW LOW 30 MIN: CPT | Mod: 25

## 2025-03-12 PROCEDURE — 31231 NASAL ENDOSCOPY DX: CPT

## 2025-03-12 RX ORDER — MUPIROCIN 20 MG/G
2 OINTMENT TOPICAL
Qty: 1 | Refills: 2 | Status: ACTIVE | COMMUNITY
Start: 2025-03-12 | End: 1900-01-01

## 2025-03-12 RX ORDER — AZITHROMYCIN 250 MG/1
250 TABLET, FILM COATED ORAL
Qty: 1 | Refills: 3 | Status: ACTIVE | COMMUNITY
Start: 2025-03-12 | End: 1900-01-01

## 2025-03-19 ENCOUNTER — APPOINTMENT (OUTPATIENT)
Dept: CT IMAGING | Facility: CLINIC | Age: 50
End: 2025-03-19

## 2025-03-21 RX ORDER — FLUTICASONE PROPIONATE 50 UG/1
50 SPRAY, METERED NASAL
Qty: 1 | Refills: 3 | Status: ACTIVE | COMMUNITY
Start: 2025-03-21 | End: 1900-01-01